# Patient Record
Sex: FEMALE | Race: WHITE
[De-identification: names, ages, dates, MRNs, and addresses within clinical notes are randomized per-mention and may not be internally consistent; named-entity substitution may affect disease eponyms.]

---

## 2018-03-03 ENCOUNTER — HOSPITAL ENCOUNTER (INPATIENT)
Dept: HOSPITAL 26 - MED | Age: 68
LOS: 6 days | Discharge: LEFT BEFORE DISCHARGE | DRG: 673 | End: 2018-03-09
Attending: FAMILY MEDICINE | Admitting: FAMILY MEDICINE
Payer: COMMERCIAL

## 2018-03-03 VITALS — SYSTOLIC BLOOD PRESSURE: 115 MMHG | DIASTOLIC BLOOD PRESSURE: 48 MMHG

## 2018-03-03 VITALS — WEIGHT: 153.04 LBS | HEIGHT: 62 IN | BODY MASS INDEX: 28.16 KG/M2

## 2018-03-03 VITALS — DIASTOLIC BLOOD PRESSURE: 74 MMHG | SYSTOLIC BLOOD PRESSURE: 152 MMHG

## 2018-03-03 DIAGNOSIS — E87.70: ICD-10-CM

## 2018-03-03 DIAGNOSIS — E11.22: ICD-10-CM

## 2018-03-03 DIAGNOSIS — E86.0: ICD-10-CM

## 2018-03-03 DIAGNOSIS — E46: ICD-10-CM

## 2018-03-03 DIAGNOSIS — N25.81: ICD-10-CM

## 2018-03-03 DIAGNOSIS — J44.1: ICD-10-CM

## 2018-03-03 DIAGNOSIS — N18.6: ICD-10-CM

## 2018-03-03 DIAGNOSIS — Z91.15: ICD-10-CM

## 2018-03-03 DIAGNOSIS — E66.9: ICD-10-CM

## 2018-03-03 DIAGNOSIS — E83.39: ICD-10-CM

## 2018-03-03 DIAGNOSIS — N17.9: ICD-10-CM

## 2018-03-03 DIAGNOSIS — J44.0: ICD-10-CM

## 2018-03-03 DIAGNOSIS — E78.00: ICD-10-CM

## 2018-03-03 DIAGNOSIS — Z79.4: ICD-10-CM

## 2018-03-03 DIAGNOSIS — I12.0: Primary | ICD-10-CM

## 2018-03-03 DIAGNOSIS — Z99.2: ICD-10-CM

## 2018-03-03 DIAGNOSIS — D63.8: ICD-10-CM

## 2018-03-03 DIAGNOSIS — J20.9: ICD-10-CM

## 2018-03-03 DIAGNOSIS — E11.65: ICD-10-CM

## 2018-03-03 LAB
ALBUMIN FLD-MCNC: 2.3 G/DL (ref 3.4–5)
ANION GAP SERPL CALCULATED.3IONS-SCNC: 13.2 MMOL/L (ref 8–16)
ANION GAP SERPL CALCULATED.3IONS-SCNC: 17.4 MMOL/L (ref 8–16)
APPEARANCE UR: CLEAR
AST SERPL-CCNC: 9 U/L (ref 15–37)
BASOPHILS # BLD AUTO: 0.1 K/UL (ref 0–0.22)
BASOPHILS NFR BLD AUTO: 0.5 % (ref 0–2)
BILIRUB SERPL-MCNC: 0.2 MG/DL (ref 0–1)
BILIRUB UR QL STRIP: NEGATIVE
BUN SERPL-MCNC: 145 MG/DL (ref 7–18)
BUN SERPL-MCNC: 150 MG/DL (ref 7–18)
CHLORIDE SERPL-SCNC: 99 MMOL/L (ref 98–107)
CHLORIDE SERPL-SCNC: 99 MMOL/L (ref 98–107)
CO2 SERPL-SCNC: 21.2 MMOL/L (ref 21–32)
CO2 SERPL-SCNC: 23 MMOL/L (ref 21–32)
COLOR UR: (no result)
CREAT SERPL-MCNC: 6.9 MG/DL (ref 0.6–1.3)
CREAT SERPL-MCNC: 6.9 MG/DL (ref 0.6–1.3)
EOSINOPHIL # BLD AUTO: 0.4 K/UL (ref 0–0.4)
EOSINOPHIL NFR BLD AUTO: 3.2 % (ref 0–4)
ERYTHROCYTE [DISTWIDTH] IN BLOOD BY AUTOMATED COUNT: 12.9 % (ref 11.6–13.7)
GFR SERPL CREATININE-BSD FRML MDRD: 8 ML/MIN (ref 90–?)
GFR SERPL CREATININE-BSD FRML MDRD: 8 ML/MIN (ref 90–?)
GLUCOSE SERPL-MCNC: 224 MG/DL (ref 74–106)
GLUCOSE SERPL-MCNC: 248 MG/DL (ref 74–106)
GLUCOSE UR STRIP-MCNC: (no result) MG/DL
HCT VFR BLD AUTO: 22 % (ref 36–48)
HGB BLD-MCNC: 7.4 G/DL (ref 12–16)
HGB UR QL STRIP: (no result)
LEUKOCYTE ESTERASE UR QL STRIP: NEGATIVE
LYMPHOCYTES # BLD AUTO: 2 K/UL (ref 2.5–16.5)
LYMPHOCYTES NFR BLD AUTO: 15.3 % (ref 20.5–51.1)
MCH RBC QN AUTO: 29 PG (ref 27–31)
MCHC RBC AUTO-ENTMCNC: 34 G/DL (ref 33–37)
MCV RBC AUTO: 87 FL (ref 80–94)
MONOCYTES # BLD AUTO: 0.7 K/UL (ref 0.8–1)
MONOCYTES NFR BLD AUTO: 5.5 % (ref 1.7–9.3)
NEUTROPHILS # BLD AUTO: 9.9 K/UL (ref 1.8–7.7)
NEUTROPHILS NFR BLD AUTO: 75.5 % (ref 42.2–75.2)
NITRITE UR QL STRIP: NEGATIVE
PH UR STRIP: 5.5 [PH] (ref 5–9)
PLATELET # BLD AUTO: 271 K/UL (ref 140–450)
POTASSIUM SERPL-SCNC: 3.6 MMOL/L (ref 3.5–5.1)
POTASSIUM SERPL-SCNC: 4.2 MMOL/L (ref 3.5–5.1)
PROTHROMBIN TIME: 9.4 SECS (ref 10.8–13.4)
RBC # BLD AUTO: 2.53 MIL/UL (ref 4.2–5.4)
RBC #/AREA URNS HPF: (no result) /HPF (ref 0–5)
SODIUM SERPL-SCNC: 131 MMOL/L (ref 136–145)
SODIUM SERPL-SCNC: 134 MMOL/L (ref 136–145)
WBC # BLD AUTO: 13.1 K/UL (ref 4.8–10.8)
WBC,URINE: (no result) /HPF (ref 0–5)

## 2018-03-03 PROCEDURE — C1750 CATH, HEMODIALYSIS,LONG-TERM: HCPCS

## 2018-03-03 PROCEDURE — C1758 CATHETER, URETERAL: HCPCS

## 2018-03-03 PROCEDURE — P9016 RBC LEUKOCYTES REDUCED: HCPCS

## 2018-03-03 NOTE — NUR
Patient will be admitted to care of DR LOWERY. Admited to MED/SURG.  Will go to 
[id number]. Belongings list completed.  Report to VANESSA OWUSU.

## 2018-03-03 NOTE — NUR
PAGED THELMA ACKERMAN ON CALL FOR . CALLED BACK . GAVE INFORMATION ABOUT THE PT 
CONDITION. WITH ORDERS.

## 2018-03-03 NOTE — NUR
C/O ANTERIOR CHEST WALL PAIN NON RADIATING X3 DAYS PROVOKED BY HARD COUGH

FATIGUE, HACKING CONGESTED COUGH X 2 WKS

 . DENIES N/V/D; SKIN IS PINK/WARM/DRY; AAOX4, HR EVEN AND REGULAR; PT DENIES 
ANY FEVER,SOB AT THIS TIME; PATIENT STATES PAIN OF 8/10 AT THIS TIME; VSS; 
PATIENT POSITIONED FOR COMFORT; HOB ELEVATED; BEDRAILS UP X2; BED DOWN. ER MD 
MADE AWARE OF PT STATUS.

## 2018-03-03 NOTE — NUR
ADMITTED A  67F FROM ER. CAME BY ELLIE. MED SURG PT. CAME DUE TO PRODUCTIVE COUGH AND CHEST 
PAIN DUE TO COUGH AND FEVER. AS OF THIS TIME, PT IS AFEBRILE. WITH NO /CO OF ANY PAIN NOTED. 
AMBULATORY TO BATHROOM. WITH HL ON THE RT AC #20. SKIN INTACT ,JUST AN OLD SCAB ON THE LT 
KNEE. BOTH HAND FINGER SLIGHTLY SWOLLEN. PLAN OF CARE DISCUSSED AND VERBALIZED 
UNDERSTANDING. BED ON LOW POSITION, CALL LIGHT PLACED WITHIN EASY REACH.

## 2018-03-03 NOTE — NUR
TRIED CONTACTING FAMILY (DAUGHTER LIANG) FOR  HOME MEDICATIONS BUT UNABLE . NO HOME PHONE 
NUMBER. THE CELL PHONE WITH PT. WILL ENDORSE TO AM  NURSE.

## 2018-03-04 VITALS — DIASTOLIC BLOOD PRESSURE: 90 MMHG | SYSTOLIC BLOOD PRESSURE: 176 MMHG

## 2018-03-04 VITALS — DIASTOLIC BLOOD PRESSURE: 56 MMHG | SYSTOLIC BLOOD PRESSURE: 150 MMHG

## 2018-03-04 VITALS — SYSTOLIC BLOOD PRESSURE: 123 MMHG | DIASTOLIC BLOOD PRESSURE: 55 MMHG

## 2018-03-04 VITALS — DIASTOLIC BLOOD PRESSURE: 59 MMHG | SYSTOLIC BLOOD PRESSURE: 145 MMHG

## 2018-03-04 VITALS — SYSTOLIC BLOOD PRESSURE: 145 MMHG | DIASTOLIC BLOOD PRESSURE: 62 MMHG

## 2018-03-04 LAB
ALBUMIN FLD-MCNC: 2.4 G/DL (ref 3.4–5)
ANION GAP SERPL CALCULATED.3IONS-SCNC: 20.2 MMOL/L (ref 8–16)
AST SERPL-CCNC: 6 U/L (ref 15–37)
BASOPHILS NFR BLD MANUAL: 0 % (ref 0–2)
BILIRUB SERPL-MCNC: 0.2 MG/DL (ref 0–1)
BUN SERPL-MCNC: 151 MG/DL (ref 7–18)
CHLORIDE SERPL-SCNC: 96 MMOL/L (ref 98–107)
CO2 SERPL-SCNC: 19 MMOL/L (ref 21–32)
CREAT SERPL-MCNC: 7.2 MG/DL (ref 0.6–1.3)
EOSINOPHIL NFR BLD MANUAL: 0 % (ref 0–4)
ERYTHROCYTE [DISTWIDTH] IN BLOOD BY AUTOMATED COUNT: 13 % (ref 11.6–13.7)
GFR SERPL CREATININE-BSD FRML MDRD: 7 ML/MIN (ref 90–?)
GLUCOSE SERPL-MCNC: 442 MG/DL (ref 74–106)
HCT VFR BLD AUTO: 23.1 % (ref 36–48)
HGB BLD-MCNC: 7.8 G/DL (ref 12–16)
IRON SERPL-MCNC: 58 UG/DL (ref 35–150)
LYMPHOCYTES NFR BLD MANUAL: 7 % (ref 20–46)
MAGNESIUM SERPL-MCNC: 2.2 MG/DL (ref 1.8–2.4)
MCH RBC QN AUTO: 30 PG (ref 27–31)
MCHC RBC AUTO-ENTMCNC: 34 G/DL (ref 33–37)
MCV RBC AUTO: 88 FL (ref 80–94)
MONOCYTES NFR BLD MANUAL: 5 % (ref 5–12)
PHOSPHATE SERPL-MCNC: 6.8 MG/DL (ref 2.5–4.9)
PLATELET # BLD AUTO: 268 K/UL (ref 140–450)
POTASSIUM SERPL-SCNC: 5.2 MMOL/L (ref 3.5–5.1)
RBC # BLD AUTO: 2.64 MIL/UL (ref 4.2–5.4)
SODIUM SERPL-SCNC: 130 MMOL/L (ref 136–145)
WBC # BLD AUTO: 12 K/UL (ref 4.8–10.8)

## 2018-03-04 PROCEDURE — 30233N1 TRANSFUSION OF NONAUTOLOGOUS RED BLOOD CELLS INTO PERIPHERAL VEIN, PERCUTANEOUS APPROACH: ICD-10-PCS | Performed by: FAMILY MEDICINE

## 2018-03-04 PROCEDURE — B543ZZA ULTRASONOGRAPHY OF RIGHT JUGULAR VEINS, GUIDANCE: ICD-10-PCS | Performed by: SURGERY

## 2018-03-04 PROCEDURE — 5A1D70Z PERFORMANCE OF URINARY FILTRATION, INTERMITTENT, LESS THAN 6 HOURS PER DAY: ICD-10-PCS

## 2018-03-04 PROCEDURE — 05HM33Z INSERTION OF INFUSION DEVICE INTO RIGHT INTERNAL JUGULAR VEIN, PERCUTANEOUS APPROACH: ICD-10-PCS | Performed by: SURGERY

## 2018-03-04 RX ADMIN — Medication SCH DEV: at 06:38

## 2018-03-04 RX ADMIN — Medication SCH DEV: at 11:57

## 2018-03-04 RX ADMIN — DOCUSATE SODIUM SCH MG: 50 LIQUID ORAL at 09:15

## 2018-03-04 RX ADMIN — INSULIN GLARGINE SCH UNITS: 100 INJECTION, SOLUTION SUBCUTANEOUS at 21:37

## 2018-03-04 RX ADMIN — IPRATROPIUM BROMIDE AND ALBUTEROL SULFATE PRN ML: .5; 3 SOLUTION RESPIRATORY (INHALATION) at 18:15

## 2018-03-04 RX ADMIN — FERROUS SULFATE TAB 325 MG (65 MG ELEMENTAL FE) SCH MG: 325 (65 FE) TAB at 17:52

## 2018-03-04 RX ADMIN — FERROUS SULFATE TAB 325 MG (65 MG ELEMENTAL FE) SCH MG: 325 (65 FE) TAB at 11:56

## 2018-03-04 RX ADMIN — Medication SCH DEV: at 21:33

## 2018-03-04 RX ADMIN — Medication SCH DEV: at 16:30

## 2018-03-04 RX ADMIN — INSULIN LISPRO PRN UNITS: 100 INJECTION, SOLUTION INTRAVENOUS; SUBCUTANEOUS at 21:51

## 2018-03-04 RX ADMIN — IPRATROPIUM BROMIDE AND ALBUTEROL SULFATE PRN ML: .5; 3 SOLUTION RESPIRATORY (INHALATION) at 08:43

## 2018-03-04 RX ADMIN — INSULIN LISPRO PRN UNITS: 100 INJECTION, SOLUTION INTRAVENOUS; SUBCUTANEOUS at 17:48

## 2018-03-04 RX ADMIN — GUAIFENESIN AND CODEINE PHOSPHATE PRN ML: 100; 10 SOLUTION ORAL at 13:06

## 2018-03-04 RX ADMIN — IPRATROPIUM BROMIDE AND ALBUTEROL SULFATE PRN ML: .5; 3 SOLUTION RESPIRATORY (INHALATION) at 13:25

## 2018-03-04 RX ADMIN — FERROUS SULFATE TAB 325 MG (65 MG ELEMENTAL FE) SCH MG: 325 (65 FE) TAB at 09:18

## 2018-03-04 RX ADMIN — INSULIN LISPRO PRN UNITS: 100 INJECTION, SOLUTION INTRAVENOUS; SUBCUTANEOUS at 11:57

## 2018-03-04 NOTE — NUR
PT CONTINUES TO BE RESTLESS, ATTEMPTS TO GET OUT OF BED, DOES NOT FOLLOW COMMAND, PT APPEARS 
TOO DISORIENTED AND CONFUSED FOR AMBULATION TO BATHROOM, PT PLACED ON BED PAN,  C/O KNEE 
PAIN, TYLENOL GIVEN, DR ASCENCIO ON CALL FOR DR BEVERLEY HWANG.

## 2018-03-04 NOTE — NUR
PAGED DR. LOWERY   WAS  PAGED FOR BREATHING TREATMENTS. CALLED BACK WITH ORDER. RT MADE 
AWARE OF THE ORDER.

## 2018-03-04 NOTE — NUR
PT HAS SIGN CONSENT FOR HEMODIALYSIS CATH PLACEMENT, HEMODIALYSIS AND BLOOD TRANSFUSION. DR CHAMBERS HAS INSERTED SO CATH FOR HEMODIALYSIS ON THE RIGHT IJ. CENTRAL LINE DRESSING 
APPLIED.

## 2018-03-04 NOTE — NUR
PT UNABLE TO SIT STILL , AGITATED, RESP LABORED AUDIBLY WHEEZING AND WET BREATH SOUNDS, FREQ 
COUGHS WITH COPIOUS AMOUNT OF CLEAR SPUTUM, RT PAGED FOR PRN NEB TREATMENT, PT AWAITING 
DIALYSIS IN 1HR.

## 2018-03-04 NOTE — NUR
DR ASCENCIO CALLED BACK, PT CONDITION REPORTED, TELEPHONE ORDER RECEIVED FOR ONE TIME ATIVAN 
PRIOR TO DIALYSIS.  WILL REPORT TO PRIMARY NURSE.

## 2018-03-04 NOTE — NUR
PT AGITATED. PT STILL ON HEMODIALYSIS. TRIED TO PULL OUT HD CATHETER. ATIVAN 1MG IV PUSH WAS 
GIVEN AS PER ORDER.

## 2018-03-04 NOTE — NUR
PT REPORT RECEIVED FROM NIGHT SHIFT RN. PT IS AAOX4. PT HAS PRODUCTIVE COUGH AND SPASM. 
ESPINAL IN PLACE DRAINING CLEAR YELLOW URINE. IV NOTED TO RIGHT AC 20G SALINE LOCK, AND RIGHT 
FA 22G INFUSING WELL AND ASYMPTOMATIC. SKIN INTACT, AN OLD SCAB NOTED ON THE L KNEE. PLAN OF 
CARE DISCUSSED AND PT VERBALIZED UNDERSTANDING. BED ON LOW POSITION, CALL LIGHT PLACED 
WITHIN REACH. WILL CONTINUE TO MONITOR.

## 2018-03-04 NOTE — NUR
BLOOD SUGAR WAS CHECKED THIS AM FOR PT IS DIABETIC. RESULT 406. PAGED DR. FRANCIS , ON CALL FOR 
DR. LOWERY. CALLED BACK,. MADE HER AWARE OF THE BS. BS AC AND HS WITH SLIDING SCALE ORDER . 
TO GIVE HUMALOG 12 UNITS SUB Q ONCE  TO COVER  BLOOD SUGAR THIS AM.

## 2018-03-04 NOTE — NUR
HD DONE . NO OUTPUT AS PER HD NURSE, JUST CLEANED AS PER MD ORDER. LATEST BP AFTER THE 
PROCEDURE 170/58. PT IS ASLEEP AT THIS TIME. WILL CONTINUE TO MONITOR.

## 2018-03-04 NOTE — NUR
RECEIVED PT FROM DAY SHIFT NURSE JAYA-RN. AOX4, KAYLYNN PT. ON 2L/NC - 99% SAO2, NO S/S OF 
RESPIRATORY DISTRESS OR DISCOMFORT NOTED. RIGHT FA #22G HEPLOCK. RIGHT AC #20G HEPLOCK. WITH 
NEW RIGHT JUGULAR SO CATHETER FOR DIALYSIS SCHEDULED TONIGHT. ESPINAL CATHETER TO 
GRAVITY, CLEAR YELLOW URINE OUTPUT. BILATERAL LOWER EXTREMITY SCD MACHINE ON. BED IN LOWEST 
POSITION. CALL LIGHT WITHIN REACH. WILL CONTINUE TO MONITOR.

## 2018-03-04 NOTE — NUR
PT CLEANED UP IN BED.  REPOSITIONED FOR COMFORT THEN VITAL SIGNS TAKEN. /90, HR-102. 
WILL CALL MD TO MAKE AWARE.

## 2018-03-05 VITALS — DIASTOLIC BLOOD PRESSURE: 52 MMHG | SYSTOLIC BLOOD PRESSURE: 129 MMHG

## 2018-03-05 VITALS — SYSTOLIC BLOOD PRESSURE: 150 MMHG | DIASTOLIC BLOOD PRESSURE: 62 MMHG

## 2018-03-05 VITALS — SYSTOLIC BLOOD PRESSURE: 142 MMHG | DIASTOLIC BLOOD PRESSURE: 64 MMHG

## 2018-03-05 LAB
ALBUMIN FLD-MCNC: 2.3 G/DL (ref 3.4–5)
ANION GAP SERPL CALCULATED.3IONS-SCNC: 12.7 MMOL/L (ref 8–16)
ANION GAP SERPL CALCULATED.3IONS-SCNC: 12.7 MMOL/L (ref 8–16)
AST SERPL-CCNC: 10 U/L (ref 15–37)
BILIRUB SERPL-MCNC: 0.3 MG/DL (ref 0–1)
BUN SERPL-MCNC: 73 MG/DL (ref 7–18)
BUN SERPL-MCNC: 73 MG/DL (ref 7–18)
CHLORIDE SERPL-SCNC: 98 MMOL/L (ref 98–107)
CHLORIDE SERPL-SCNC: 98 MMOL/L (ref 98–107)
CO2 SERPL-SCNC: 27.7 MMOL/L (ref 21–32)
CO2 SERPL-SCNC: 27.7 MMOL/L (ref 21–32)
CREAT SERPL-MCNC: 4.3 MG/DL (ref 0.6–1.3)
CREAT SERPL-MCNC: 4.3 MG/DL (ref 0.6–1.3)
ERYTHROCYTE [DISTWIDTH] IN BLOOD BY AUTOMATED COUNT: 12.9 % (ref 11.6–13.7)
GFR SERPL CREATININE-BSD FRML MDRD: 13 ML/MIN (ref 90–?)
GFR SERPL CREATININE-BSD FRML MDRD: 13 ML/MIN (ref 90–?)
GLUCOSE SERPL-MCNC: 244 MG/DL (ref 74–106)
GLUCOSE SERPL-MCNC: 244 MG/DL (ref 74–106)
HCT VFR BLD AUTO: 28.3 % (ref 36–48)
HGB BLD-MCNC: 9.6 G/DL (ref 12–16)
IRON SERPL-MCNC: 119 UG/DL (ref 35–150)
LYMPHOCYTES NFR BLD MANUAL: 3 % (ref 20–46)
MCH RBC QN AUTO: 30 PG (ref 27–31)
MCHC RBC AUTO-ENTMCNC: 34 G/DL (ref 33–37)
MCV RBC AUTO: 87 FL (ref 80–94)
MONOCYTES NFR BLD MANUAL: 2 % (ref 5–12)
PLATELET # BLD AUTO: 207 K/UL (ref 140–450)
POTASSIUM SERPL-SCNC: 3.4 MMOL/L (ref 3.5–5.1)
POTASSIUM SERPL-SCNC: 3.4 MMOL/L (ref 3.5–5.1)
RBC # BLD AUTO: 3.25 MIL/UL (ref 4.2–5.4)
SODIUM SERPL-SCNC: 135 MMOL/L (ref 136–145)
SODIUM SERPL-SCNC: 135 MMOL/L (ref 136–145)
TIBC SERPL-MCNC: 165 UG/DL (ref 250–450)
WBC # BLD AUTO: 21.7 K/UL (ref 4.8–10.8)

## 2018-03-05 PROCEDURE — 5A1D70Z PERFORMANCE OF URINARY FILTRATION, INTERMITTENT, LESS THAN 6 HOURS PER DAY: ICD-10-PCS

## 2018-03-05 RX ADMIN — IPRATROPIUM BROMIDE AND ALBUTEROL SULFATE PRN ML: .5; 3 SOLUTION RESPIRATORY (INHALATION) at 18:57

## 2018-03-05 RX ADMIN — INSULIN GLARGINE SCH UNITS: 100 INJECTION, SOLUTION SUBCUTANEOUS at 21:12

## 2018-03-05 RX ADMIN — DOCUSATE SODIUM SCH MG: 50 LIQUID ORAL at 08:52

## 2018-03-05 RX ADMIN — FERROUS SULFATE TAB 325 MG (65 MG ELEMENTAL FE) SCH MG: 325 (65 FE) TAB at 17:04

## 2018-03-05 RX ADMIN — FERROUS SULFATE TAB 325 MG (65 MG ELEMENTAL FE) SCH MG: 325 (65 FE) TAB at 08:52

## 2018-03-05 RX ADMIN — FERROUS SULFATE TAB 325 MG (65 MG ELEMENTAL FE) SCH MG: 325 (65 FE) TAB at 12:11

## 2018-03-05 RX ADMIN — IPRATROPIUM BROMIDE AND ALBUTEROL SULFATE PRN ML: .5; 3 SOLUTION RESPIRATORY (INHALATION) at 13:20

## 2018-03-05 RX ADMIN — Medication SCH DEV: at 06:11

## 2018-03-05 RX ADMIN — Medication SCH DEV: at 11:42

## 2018-03-05 RX ADMIN — INSULIN LISPRO PRN UNITS: 100 INJECTION, SOLUTION INTRAVENOUS; SUBCUTANEOUS at 06:12

## 2018-03-05 RX ADMIN — INSULIN LISPRO PRN UNITS: 100 INJECTION, SOLUTION INTRAVENOUS; SUBCUTANEOUS at 21:12

## 2018-03-05 RX ADMIN — IPRATROPIUM BROMIDE AND ALBUTEROL SULFATE PRN ML: .5; 3 SOLUTION RESPIRATORY (INHALATION) at 06:06

## 2018-03-05 RX ADMIN — GUAIFENESIN AND CODEINE PHOSPHATE PRN ML: 100; 10 SOLUTION ORAL at 00:41

## 2018-03-05 RX ADMIN — INSULIN LISPRO PRN UNITS: 100 INJECTION, SOLUTION INTRAVENOUS; SUBCUTANEOUS at 17:08

## 2018-03-05 RX ADMIN — Medication SCH DEV: at 21:00

## 2018-03-05 RX ADMIN — INSULIN LISPRO PRN UNITS: 100 INJECTION, SOLUTION INTRAVENOUS; SUBCUTANEOUS at 12:15

## 2018-03-05 RX ADMIN — Medication SCH DEV: at 16:37

## 2018-03-05 NOTE — NUR
RECEIVED ORDER TO SET UP PATIENT FOR OP HD AT Located within [facility] [place].   I CALLED THE 
DIALYSIS CENTER AND SPOKE WITH BRADEN.    FAXED INFORMATION TO HER [phone]   PHONE   
[phone].    NO HEP PANEL YET.   PATIENT STILL HAVE SO CATH.

## 2018-03-05 NOTE — NUR
HS SNACK GIVEN. CALL LIGHT WITHIN REACH. CARE BOARD UPDATED. PLAN OF CARE DISCUSSED, 
VERBALIZED UNDERSTANDING WELL.

## 2018-03-05 NOTE — NUR
DR. CHAMBERS NOTED WBC-21.4, WANTS TO CALL PRIMARY MD TO INQUIRE FOR POSSIBILE ANTIBIOTIC, DR. ACEVEDO MADE AWARE AND BLOOD CULTURE X2 SENT.

## 2018-03-05 NOTE — NUR
REMOVED PATIENTS ESPINAL CATHETER PER DR SYED. PATIENT TOLERATED WELL. REMINDED HER THAT THE 
BEDSIDE COMMODE IS NEXT TO HER IF SHE NEEDS TO URINATE. CALL LIGHT WITHIN REACH. WILL 
CONTINUE TO MONITOR.

## 2018-03-05 NOTE — NUR
PT TRYING TO GET OUT OF BED. PT GETTING CONFUSED. PT NEEDING REDIRECTING. BE ALARM ON FOR 
SAFETY. WILL CONTINUE TO MONITOR.

## 2018-03-05 NOTE — NUR
PATIENT HAS BEEN SCREENED AND CATEGORIZED AS MODERATE NUTRITION RISK. PATIENT WILL BE SEEN 
WITHIN 3-5 DAYS OF ADMISSION.



3/5/18-3/7/18



ROJELIO HYATT RD

## 2018-03-05 NOTE — NUR
ASSUMED CARE OF PATIENT, AWAKE, ALERT AND ORIENTED. NO COMPLAINS. NO DISTRESS. CALL LIGHT 
WITHIN REACH.

## 2018-03-05 NOTE — NUR
RECEIVED REPORT FROM NIGHT SHIFT RN. PATIENT IS AAOX2, ON NASAL CANNULA, NO SIGNS AND 
SYMPTOMS OF ACUTE DISTRESS NOTED AT THIS TIME. PATIENT HAS IV TO RIGHT FOREARM 22G, SALINE 
LOCK. SITE IS CLEAN, DRY, PATENT AND INTACT. HAS RIGHT IJ SO CATH FOR DIALYSIS. 
DIALYSIS NURSE IS CURRENTLY HERE SETTING UP. PATIENT HAS ESPINAL CATHETER IN PLACE. ALSO HAS 
BEDSIDE COMMODE AT THE BEDSIDE. DISCUSSED PLAN OF CARE WITH PATIENT AND REINFORCEMENT WAS 
NEEDED. BED IS IN LOWEST POSITION, SIDE RAILS UP X3, CALL LIGHT WITHIN REACH, FALL RISK 
PROTOCOL IN PLACE. WILL CONTINUE TO MONITOR.

## 2018-03-06 VITALS — DIASTOLIC BLOOD PRESSURE: 55 MMHG | SYSTOLIC BLOOD PRESSURE: 133 MMHG

## 2018-03-06 VITALS — SYSTOLIC BLOOD PRESSURE: 133 MMHG | DIASTOLIC BLOOD PRESSURE: 66 MMHG

## 2018-03-06 VITALS — DIASTOLIC BLOOD PRESSURE: 60 MMHG | SYSTOLIC BLOOD PRESSURE: 168 MMHG

## 2018-03-06 LAB
ALBUMIN FLD-MCNC: 2.2 G/DL (ref 3.4–5)
ANION GAP SERPL CALCULATED.3IONS-SCNC: 12.2 MMOL/L (ref 8–16)
ANION GAP SERPL CALCULATED.3IONS-SCNC: 13.2 MMOL/L (ref 8–16)
AST SERPL-CCNC: 7 U/L (ref 15–37)
BASOPHILS # BLD AUTO: 0.1 K/UL (ref 0–0.22)
BASOPHILS NFR BLD AUTO: 0.4 % (ref 0–2)
BILIRUB SERPL-MCNC: 0.3 MG/DL (ref 0–1)
BUN SERPL-MCNC: 48 MG/DL (ref 7–18)
BUN SERPL-MCNC: 49 MG/DL (ref 7–18)
CHLORIDE SERPL-SCNC: 100 MMOL/L (ref 98–107)
CHLORIDE SERPL-SCNC: 100 MMOL/L (ref 98–107)
CO2 SERPL-SCNC: 29.2 MMOL/L (ref 21–32)
CO2 SERPL-SCNC: 29.2 MMOL/L (ref 21–32)
CREAT SERPL-MCNC: 3.8 MG/DL (ref 0.6–1.3)
CREAT SERPL-MCNC: 3.8 MG/DL (ref 0.6–1.3)
EOSINOPHIL # BLD AUTO: 0.1 K/UL (ref 0–0.4)
EOSINOPHIL NFR BLD AUTO: 0.4 % (ref 0–4)
ERYTHROCYTE [DISTWIDTH] IN BLOOD BY AUTOMATED COUNT: 13.1 % (ref 11.6–13.7)
GFR SERPL CREATININE-BSD FRML MDRD: 15 ML/MIN (ref 90–?)
GFR SERPL CREATININE-BSD FRML MDRD: 15 ML/MIN (ref 90–?)
GLUCOSE SERPL-MCNC: 112 MG/DL (ref 74–106)
GLUCOSE SERPL-MCNC: 112 MG/DL (ref 74–106)
HCT VFR BLD AUTO: 30.5 % (ref 36–48)
HGB BLD-MCNC: 10.4 G/DL (ref 12–16)
LYMPHOCYTES # BLD AUTO: 2.1 K/UL (ref 2.5–16.5)
LYMPHOCYTES NFR BLD AUTO: 12.8 % (ref 20.5–51.1)
MCH RBC QN AUTO: 30 PG (ref 27–31)
MCHC RBC AUTO-ENTMCNC: 34 G/DL (ref 33–37)
MCV RBC AUTO: 88 FL (ref 80–94)
MONOCYTES # BLD AUTO: 1.3 K/UL (ref 0.8–1)
MONOCYTES NFR BLD AUTO: 8 % (ref 1.7–9.3)
NEUTROPHILS # BLD AUTO: 12.9 K/UL (ref 1.8–7.7)
NEUTROPHILS NFR BLD AUTO: 78.4 % (ref 42.2–75.2)
PLATELET # BLD AUTO: 223 K/UL (ref 140–450)
POTASSIUM SERPL-SCNC: 3.4 MMOL/L (ref 3.5–5.1)
POTASSIUM SERPL-SCNC: 3.4 MMOL/L (ref 3.5–5.1)
RBC # BLD AUTO: 3.45 MIL/UL (ref 4.2–5.4)
SODIUM SERPL-SCNC: 138 MMOL/L (ref 136–145)
SODIUM SERPL-SCNC: 139 MMOL/L (ref 136–145)
WBC # BLD AUTO: 16.5 K/UL (ref 4.8–10.8)

## 2018-03-06 RX ADMIN — FERROUS SULFATE TAB 325 MG (65 MG ELEMENTAL FE) SCH MG: 325 (65 FE) TAB at 08:39

## 2018-03-06 RX ADMIN — Medication SCH DEV: at 16:21

## 2018-03-06 RX ADMIN — Medication SCH DEV: at 20:51

## 2018-03-06 RX ADMIN — Medication SCH DEV: at 11:43

## 2018-03-06 RX ADMIN — INSULIN GLARGINE SCH UNITS: 100 INJECTION, SOLUTION SUBCUTANEOUS at 20:54

## 2018-03-06 RX ADMIN — GUAIFENESIN AND CODEINE PHOSPHATE PRN ML: 100; 10 SOLUTION ORAL at 21:13

## 2018-03-06 RX ADMIN — IPRATROPIUM BROMIDE AND ALBUTEROL SULFATE PRN ML: .5; 3 SOLUTION RESPIRATORY (INHALATION) at 06:51

## 2018-03-06 RX ADMIN — INSULIN LISPRO PRN UNITS: 100 INJECTION, SOLUTION INTRAVENOUS; SUBCUTANEOUS at 17:14

## 2018-03-06 RX ADMIN — FERROUS SULFATE TAB 325 MG (65 MG ELEMENTAL FE) SCH MG: 325 (65 FE) TAB at 17:13

## 2018-03-06 RX ADMIN — INSULIN LISPRO PRN UNITS: 100 INJECTION, SOLUTION INTRAVENOUS; SUBCUTANEOUS at 12:08

## 2018-03-06 RX ADMIN — FERROUS SULFATE TAB 325 MG (65 MG ELEMENTAL FE) SCH MG: 325 (65 FE) TAB at 12:02

## 2018-03-06 RX ADMIN — Medication SCH DEV: at 05:58

## 2018-03-06 RX ADMIN — IPRATROPIUM BROMIDE AND ALBUTEROL SULFATE PRN ML: .5; 3 SOLUTION RESPIRATORY (INHALATION) at 13:07

## 2018-03-06 RX ADMIN — INSULIN LISPRO PRN UNITS: 100 INJECTION, SOLUTION INTRAVENOUS; SUBCUTANEOUS at 20:57

## 2018-03-06 RX ADMIN — DOCUSATE SODIUM SCH MG: 50 LIQUID ORAL at 08:43

## 2018-03-06 NOTE — NUR
I met with Patient and wife Clara Naqvi at bedside to discuss patient's discharge to SNF( 
Skilled Nursing Facility) [facility].  Mrs. Naqvi was stated feeling concern 
that Patient will be discharge to SNF when she has not visited the place and location, 
stated " I just don't Know if it a good place for my "  These writer explained to 
Mrs. Naqvi that facility has served community and many patient's with similar needs that 
her  has and explained that  Theresa has made every effort to find an 
facility closer to her home, that can provide services Patient is in need for and coordinate 
all transportation to placement, I Also explained that Patient needs to discharge today to a 
different level of care and that if he is not to discharge and she declined services; she 
will possibly be financially responsible for the hospitalization due to her declining of 
services.  Mrs Naqvi expressed understanding and stated that she just wants to see facility 
before Patient is send there for treatment I explained to her that she can and she can also 
Speak to [facility] facility Staff.  Mrs. Naqvi agreed and Shanita from [facility] call Mrs. Naqvi and arrange for her to go see facility at about 5:20.  Mrs. Naqvi Ended call and 
stated to these writer that she was in her way and will comeback to [facility] before transport is 
here ready to move Patient. She thank me for my assistance as we walk out the room stating 
that she will be back.

## 2018-03-06 NOTE — NUR
RECEIVED A CALL FROM BRADEN ON 3/5/18 FROM [facility].   SHE SAID THE CHAIR TIME 
FOR THIS PATIENT WILL BE TUESDAY, THURSDAY, SATURDAY AT 8:30A.M. AT [facility]  
[address] [facility] [address]   PHONE [phone]

## 2018-03-06 NOTE — NUR
USED  PHONE TO OBTAIN CONSENT FOR PERMACATH PLACEMENT. PATIENT AGREED TO PROCEDURE 
AND SIGNED CONSENT.

## 2018-03-06 NOTE — NUR
Patient in distress d/t her hair being caught on tape from her IJ quintion cath. Patient 
does not want us to pull the hair off the tape, she refused and told us to cut it. CNA 
helped remove some hair but she urged us to cut some of the hair off. Once we cut it she was 
relieved. Will continue to monitor the patient.

## 2018-03-06 NOTE — NUR
Patient sitting on bed, watching TV. No complaints of pain. Stabilized IJ alanna catheter 
with tape, it becomes loose when she sleeps on it. Will continue to monitor

## 2018-03-06 NOTE — NUR
RECD. RESTING IN BED, AWAKE, A/OX3. RESPIRATION EVEN AND UNLABORED. 02 SATURATING AT 96% ON 
ROOM AIR. SO CATH AT THE RIGHT IJ WITH DRESSING, FOR CHANGING IN AM,  IV SALINE LOCK AT 
THE RIGHT FOREARM G22, PATENT AND INTACT. USES BSC. SAFETY MEASURES ENFORCED. ON BILATERAL 
LEG SEQUENTIALS. PLAN OF CARE FOR THE SHIFT DISCUSSED. VERBALIZED UNDERSTANDING. DENIES PAIN 
0/10.

## 2018-03-06 NOTE — NUR
I call Patient's wife Clara Naqvi at [phone], to discuss discharge for patient today 
with MD. Recommendation to go to a Skilled Nursing Facility. I inform Mrs. Naqvi that case 
annette has attempted to have patient go to [facility] however; insurance did not approved 
due to patient needing a higher level of care.  Mrs. Naqvi stated that she did not want 
Patient to go to [place] and was thinking to take him home. I explained to Patient's wife 
Mrs. Naqvi that in order for  to search for Placement it needed to be discussed 
with her and been on agreement for the process of finding a SNF.  She verbalized 
understanding, and stated that she will like to see those places first.   I explained to 
Mrs. Naqvi that the process usually is handle by  and it consist on finding a 
bed and  facility that contracts with patient's insurance and when found it is sometimes 
very difficult to coordinate the visit first and it is possible to loose the bed if a delay 
was to happen.  Mrs. Naqvi agreed and verbalized understanding, she also stated that it was 
fine for case annette to search for a SNF.  I thank her for information and I ended the call.

## 2018-03-07 VITALS — DIASTOLIC BLOOD PRESSURE: 65 MMHG | SYSTOLIC BLOOD PRESSURE: 127 MMHG

## 2018-03-07 VITALS — DIASTOLIC BLOOD PRESSURE: 64 MMHG | SYSTOLIC BLOOD PRESSURE: 156 MMHG

## 2018-03-07 VITALS — DIASTOLIC BLOOD PRESSURE: 73 MMHG | SYSTOLIC BLOOD PRESSURE: 177 MMHG

## 2018-03-07 LAB
ANION GAP SERPL CALCULATED.3IONS-SCNC: 13.2 MMOL/L (ref 8–16)
BUN SERPL-MCNC: 64 MG/DL (ref 7–18)
CHLORIDE SERPL-SCNC: 100 MMOL/L (ref 98–107)
CO2 SERPL-SCNC: 27.3 MMOL/L (ref 21–32)
CREAT SERPL-MCNC: 4.5 MG/DL (ref 0.6–1.3)
EOSINOPHIL NFR BLD MANUAL: 1 % (ref 0–4)
ERYTHROCYTE [DISTWIDTH] IN BLOOD BY AUTOMATED COUNT: 13.1 % (ref 11.6–13.7)
GFR SERPL CREATININE-BSD FRML MDRD: 13 ML/MIN (ref 90–?)
GLUCOSE SERPL-MCNC: 155 MG/DL (ref 74–106)
HCT VFR BLD AUTO: 29.9 % (ref 36–48)
HGB BLD-MCNC: 9.5 G/DL (ref 12–16)
LYMPHOCYTES NFR BLD MANUAL: 12 % (ref 20–46)
MCH RBC QN AUTO: 28 PG (ref 27–31)
MCHC RBC AUTO-ENTMCNC: 32 G/DL (ref 33–37)
MCV RBC AUTO: 88 FL (ref 80–94)
MONOCYTES NFR BLD MANUAL: 5 % (ref 5–12)
PLATELET # BLD AUTO: 206 K/UL (ref 140–450)
POTASSIUM SERPL-SCNC: 3.5 MMOL/L (ref 3.5–5.1)
RBC # BLD AUTO: 3.41 MIL/UL (ref 4.2–5.4)
SODIUM SERPL-SCNC: 137 MMOL/L (ref 136–145)
WBC # BLD AUTO: 15.2 K/UL (ref 4.8–10.8)

## 2018-03-07 PROCEDURE — 5A1D70Z PERFORMANCE OF URINARY FILTRATION, INTERMITTENT, LESS THAN 6 HOURS PER DAY: ICD-10-PCS

## 2018-03-07 RX ADMIN — INSULIN LISPRO PRN UNITS: 100 INJECTION, SOLUTION INTRAVENOUS; SUBCUTANEOUS at 21:19

## 2018-03-07 RX ADMIN — DOCUSATE SODIUM SCH MG: 50 LIQUID ORAL at 09:00

## 2018-03-07 RX ADMIN — PIPERACILLIN SODIUM AND TAZOBACTAM SODIUM SCH MLS/HR: 2; .25 INJECTION, SOLUTION INTRAVENOUS at 21:34

## 2018-03-07 RX ADMIN — FERROUS SULFATE TAB 325 MG (65 MG ELEMENTAL FE) SCH MG: 325 (65 FE) TAB at 16:43

## 2018-03-07 RX ADMIN — INSULIN GLARGINE SCH UNITS: 100 INJECTION, SOLUTION SUBCUTANEOUS at 21:18

## 2018-03-07 RX ADMIN — Medication SCH DEV: at 12:20

## 2018-03-07 RX ADMIN — GUAIFENESIN AND CODEINE PHOSPHATE PRN ML: 100; 10 SOLUTION ORAL at 03:39

## 2018-03-07 RX ADMIN — INSULIN LISPRO PRN UNITS: 100 INJECTION, SOLUTION INTRAVENOUS; SUBCUTANEOUS at 12:24

## 2018-03-07 RX ADMIN — FERROUS SULFATE TAB 325 MG (65 MG ELEMENTAL FE) SCH MG: 325 (65 FE) TAB at 12:21

## 2018-03-07 RX ADMIN — FERROUS SULFATE TAB 325 MG (65 MG ELEMENTAL FE) SCH MG: 325 (65 FE) TAB at 08:00

## 2018-03-07 RX ADMIN — Medication SCH DEV: at 16:52

## 2018-03-07 RX ADMIN — Medication SCH DEV: at 06:12

## 2018-03-07 RX ADMIN — Medication SCH DEV: at 21:14

## 2018-03-07 RX ADMIN — PIPERACILLIN SODIUM AND TAZOBACTAM SODIUM SCH MLS/HR: 2; .25 INJECTION, SOLUTION INTRAVENOUS at 21:30

## 2018-03-07 NOTE — NUR
SPOKE TO DR. LOWERY, I LET HIM KNOW I HAD SPOKEN TO DR. CHAMBERS AND HE WOULD LIKE FOR THE PATIENT 
TO BE RECEIVING ANTIBIOTIC TREATMENT FOR HER ELEVATED WBC BEFORE HE DOES THE TUNNEL CATH. 
PER DR. LOWERY HE WILL PUT IN AN ANTIBIOTIC ORDER.

## 2018-03-07 NOTE — NUR
SPOKE TO DR. CHAMBERS, I ASKED HIM IF THE PATIENT WOULD BE HAVING THE PERMA CATH DONE TODAY. PER 
DR. CHAMBERS, PROCEDURE WILL MOST LIKELY BE DONE TOMORROW NIGHT, 3/8/18, SINCE HER WBC IS STILL 
ELEVATED. PATIENT SHOULD BE ON ANTIBIOTICS. OKAY TO GO AHEAD AND DO DIALYSIS TODAY.

## 2018-03-07 NOTE — NUR
CM NOTE

CONCURRENT REVIEW  FAXED TO [facility] / FAX# [phone], ATTN: FRANCISCA #[phone]

## 2018-03-07 NOTE — NUR
LEFT THIGHS AND LEFT LOWER EXTREMITY WITH ITCHINESS, APPLIED LOTION. REFUSED MED FOR 
ITCHINESS WHEN OFFERED.

## 2018-03-07 NOTE — NUR
PT WAS SITTING ON CHAIR AT BEDSIDE, PATIENT'S BED LINENS WERE CHANGED, ALL NEEDS MET AT THIS 
TIME, CALL LIGHT WITHIN REACH.

## 2018-03-07 NOTE — NUR
RECD. RESTING IN BED, AWAKE, A/OX3. RESPIRATION EVEN AND UNLABORED. ON 02 AT 2 LITERS VIA 
N/C, 02 SAT -97%. DIALYSIS ON  GOING VIA RIGHT JUGULAR SO CATH. COMPLAINT OF PAIN IN 
THE UPPER BACK 3/10, BUT REFUSED PAIN MEDICATION. ASSISTED TO LAY ON HER SIDES AND PLACED 
TWO PILLOWS ON BACK FOR COMFORT. PLAN OF CARE FOR THE SHIFT DISCUSSED. JUST NODS HEAD. SEEMS 
IMPATIENT WITH DIALYSIS, WANTS IT TO END SOON. VERBAL ENCOURAGEMENT GIVEN. ON BILATERAL LEG 
SEQUENTIALS. DIALYSIS NURSE AT THE BEDSIDE.

## 2018-03-07 NOTE — NUR
RECEIVED REPORT FROM NIGHT SHIFT NURSE. PT IS RESTING IN BED, AAOX4, AMBULATORY, PT HAS IV 
ON HER RIGHT FA, PATENT, INTACT, FLUSHING WELL, SL, PT HAS RIGHT IJ SO CATH, NO S/S OF 
RESPIRATORY DISTRESS OR DISCOMFORT NOTED, LEFT KNEE SCAB, DISCUSSED PLAN OF CARE WITH PT, PT 
VERBALIZED UNDERSTANDING, SAFETY/FALL PRECAUTIONS ARE IN PLACE, CALL LIGHT IS WITHIN REACH, 
WILL CONTINUE TO MONITOR.

## 2018-03-07 NOTE — NUR
SLEEPING COMFORTABLY IN BED, NPO FOR PLACEMENT OF DIALYSIS PERMA CATH TODAY. CONDITION 
REMAIN STABLE. WILL ENDORSE TO AM NURSE FOR CONTINUITY OF CARE.

## 2018-03-07 NOTE — NUR
3/7/2018

RD INITIAL ASSESSMENT COMPLETED



PLEASE REFER TO NUTRITION ASSESSMENT UNDER CARE ACTIVITY FOR ESTIMATED NUTRITIONAL NEEDS. 



RD TO DISCUSS RENAL DIETARY RESTRICTIONS WITH PT (COMPLETED) 



NURSING AND DIETARY STAFF TO CONTINUE TO ENCOURAGE INCREASED INTAKE



RD TO FOLLOW-UP IN 2-3 DAYS PATIENT IS HIGH RISK.



ROJELIO HYATT, RD

## 2018-03-07 NOTE — NUR
ENDORSED PT TO NIGHT SHIFT NURSE FOR CONTINUITY OF CARE. PT STABLE AT THIS TIME, DIALYSIS 
NURSE IS AT BEDSIDE.

## 2018-03-08 VITALS — DIASTOLIC BLOOD PRESSURE: 49 MMHG | SYSTOLIC BLOOD PRESSURE: 99 MMHG

## 2018-03-08 VITALS — DIASTOLIC BLOOD PRESSURE: 67 MMHG | SYSTOLIC BLOOD PRESSURE: 128 MMHG

## 2018-03-08 VITALS — SYSTOLIC BLOOD PRESSURE: 107 MMHG | DIASTOLIC BLOOD PRESSURE: 56 MMHG

## 2018-03-08 LAB
ALBUMIN FLD-MCNC: 2.1 G/DL (ref 3.4–5)
ANION GAP SERPL CALCULATED.3IONS-SCNC: 12.4 MMOL/L (ref 8–16)
AST SERPL-CCNC: 8 U/L (ref 15–37)
BASOPHILS # BLD AUTO: 0.1 K/UL (ref 0–0.22)
BASOPHILS NFR BLD AUTO: 0.5 % (ref 0–2)
BILIRUB SERPL-MCNC: 0.3 MG/DL (ref 0–1)
BUN SERPL-MCNC: 51 MG/DL (ref 7–18)
CHLORIDE SERPL-SCNC: 100 MMOL/L (ref 98–107)
CO2 SERPL-SCNC: 28.2 MMOL/L (ref 21–32)
CREAT SERPL-MCNC: 3.8 MG/DL (ref 0.6–1.3)
EOSINOPHIL # BLD AUTO: 0.3 K/UL (ref 0–0.4)
EOSINOPHIL NFR BLD AUTO: 1.7 % (ref 0–4)
ERYTHROCYTE [DISTWIDTH] IN BLOOD BY AUTOMATED COUNT: 12.8 % (ref 11.6–13.7)
GFR SERPL CREATININE-BSD FRML MDRD: 15 ML/MIN (ref 90–?)
GLUCOSE SERPL-MCNC: 132 MG/DL (ref 74–106)
HCT VFR BLD AUTO: 30 % (ref 36–48)
HGB BLD-MCNC: 10.4 G/DL (ref 12–16)
LYMPHOCYTES # BLD AUTO: 1.5 K/UL (ref 2.5–16.5)
LYMPHOCYTES NFR BLD AUTO: 10.1 % (ref 20.5–51.1)
MCH RBC QN AUTO: 30 PG (ref 27–31)
MCHC RBC AUTO-ENTMCNC: 35 G/DL (ref 33–37)
MCV RBC AUTO: 87 FL (ref 80–94)
MONOCYTES # BLD AUTO: 0.8 K/UL (ref 0.8–1)
MONOCYTES NFR BLD AUTO: 5.3 % (ref 1.7–9.3)
NEUTROPHILS # BLD AUTO: 12.5 K/UL (ref 1.8–7.7)
NEUTROPHILS NFR BLD AUTO: 82.4 % (ref 42.2–75.2)
PLATELET # BLD AUTO: 221 K/UL (ref 140–450)
POTASSIUM SERPL-SCNC: 3.6 MMOL/L (ref 3.5–5.1)
RBC # BLD AUTO: 3.46 MIL/UL (ref 4.2–5.4)
SODIUM SERPL-SCNC: 137 MMOL/L (ref 136–145)
WBC # BLD AUTO: 15.2 K/UL (ref 4.8–10.8)

## 2018-03-08 PROCEDURE — 05PYX3Z REMOVAL OF INFUSION DEVICE FROM UPPER VEIN, EXTERNAL APPROACH: ICD-10-PCS | Performed by: SURGERY

## 2018-03-08 PROCEDURE — 0JH60XZ INSERTION OF TUNNELED VASCULAR ACCESS DEVICE INTO CHEST SUBCUTANEOUS TISSUE AND FASCIA, OPEN APPROACH: ICD-10-PCS | Performed by: SURGERY

## 2018-03-08 PROCEDURE — 05HM33Z INSERTION OF INFUSION DEVICE INTO RIGHT INTERNAL JUGULAR VEIN, PERCUTANEOUS APPROACH: ICD-10-PCS | Performed by: SURGERY

## 2018-03-08 PROCEDURE — B5131ZA FLUOROSCOPY OF RIGHT JUGULAR VEINS USING LOW OSMOLAR CONTRAST, GUIDANCE: ICD-10-PCS | Performed by: SURGERY

## 2018-03-08 RX ADMIN — FERROUS SULFATE TAB 325 MG (65 MG ELEMENTAL FE) SCH MG: 325 (65 FE) TAB at 16:19

## 2018-03-08 RX ADMIN — Medication SCH DEV: at 06:04

## 2018-03-08 RX ADMIN — FERROUS SULFATE TAB 325 MG (65 MG ELEMENTAL FE) SCH MG: 325 (65 FE) TAB at 08:42

## 2018-03-08 RX ADMIN — PIPERACILLIN SODIUM AND TAZOBACTAM SODIUM SCH MLS/HR: 2; .25 INJECTION, SOLUTION INTRAVENOUS at 21:00

## 2018-03-08 RX ADMIN — SODIUM CHLORIDE SCH MLS/HR: 9 INJECTION, SOLUTION INTRAVENOUS at 13:00

## 2018-03-08 RX ADMIN — DOCUSATE SODIUM SCH MG: 50 LIQUID ORAL at 08:43

## 2018-03-08 RX ADMIN — FERROUS SULFATE TAB 325 MG (65 MG ELEMENTAL FE) SCH MG: 325 (65 FE) TAB at 12:43

## 2018-03-08 RX ADMIN — Medication SCH DEV: at 11:48

## 2018-03-08 RX ADMIN — Medication SCH DEV: at 21:00

## 2018-03-08 RX ADMIN — INSULIN GLARGINE SCH UNITS: 100 INJECTION, SOLUTION SUBCUTANEOUS at 21:00

## 2018-03-08 RX ADMIN — PIPERACILLIN SODIUM AND TAZOBACTAM SODIUM SCH MLS/HR: 2; .25 INJECTION, SOLUTION INTRAVENOUS at 08:42

## 2018-03-08 RX ADMIN — Medication SCH DEV: at 16:20

## 2018-03-08 NOTE — NUR
PATIENT ALERT ABLE TO VERBALIZE NEEDS. NO ACUTE DISTRESS NOTED. RESP EVEN AND UNLABORED. NO 
COUGH OR CONGESTION NOTED. LUNG SOUNDS CLEAR IN ALL FIELDS. BOWEL SOUNDS ACTIVE X 4 QUADS. 
PATIENT WITH RIJ SO CATH WITH 2 LUMENS. . RFA 22G SL. PATENT AND INTACT.  PATIENT NPO 
THIS AM FOR PERMACATH PLACEMENT. PATIENT Kiswahili SPEAKING . CONT ON O2 @2L/MIN VIA 
NC.ASSISTED PATIENT TO RESTROOM. CALL LIGHT WITHIN REACH. WILL CONT TO MONITOR PT.

## 2018-03-08 NOTE — NUR
ADMINISTERED SCHEDULED MEDICATIONS AS ORDERED. WITH SIP OF WATER. PATIENT TOLERATED WELL. 
PATIENT CONT NPO FOR PROCEDURE. PATIENT ALERT AND ABLE TO MAKE NEEDS KNOWN. NO ACUTE 
DISTRESS. CALL LIGHT WITHIN REACH. WILL CONT TO MONITOR PT.

## 2018-03-08 NOTE — NUR
PATIENT RESTING IN BED COMFORTABLY. PATIENT ON THE PHONE TALKING. NO ACUTE DISTRESS NOTED. 
NO C/O PAIN AT THIS TIME. WAITING FOR PORTACATH PROCEDURE.CALL LIGHT WITHIN REACH. WILL CONT 
TO MONITOR PT.

## 2018-03-08 NOTE — NUR
RECEIVED PT FROM DAY SHIFT NURSE BARBARA Fierro RN. PT RESTING IN BED AWAITING FOR OR PROCEDURE. 
AOX4. ON 2L NC. IV RIGHT FA 22G, 40ML/HR NS, RIGHT IJ SO CATH 2 LUMENS FOR DIALYSIS. PT 
HAS BEEN NPO FOR PROCEDURE. NO S/S OF RESPIRATORY DISTRESS OR DISCOMFORT NOTED AT THIS TIME. 
CALL LIGHT WITHIN REACH. WILL CONTINUE TO MONITOR.

## 2018-03-08 NOTE — NUR
HEPARIN INJECTION WAS NOT GIVEN. PT RETURNING FROM SURGERY. SEQUENTIAL PROPHYLAXIS BILATERAL 
LOWER EXTREMITIES IN PLACE.

## 2018-03-08 NOTE — NUR
PATIENT RESTING IN BED. IV TO RFA WITH NS @40CC/HR. PATIENT TOLERATED WELL. NO ACUTE 
DISTRESS. RESP EVEN AND UNLABORED. DENIES PAIN. CALL LIGHT WITHIN REACH. WILL CONT TO 
MONITOR PT.

## 2018-03-08 NOTE — NUR
CONDITION REMAIN STABLE. NPO FOR PERMA CATH PLACEMENT TODAY. WILL ENDORSE TO AM NURSE FOR 
CONTINUITY OF CARE.

## 2018-03-08 NOTE — NUR
ADMINISTERED MORNING MEDICATIONS AS SCHEDULED. PATIENT TOLERATED WELL. PATIENT NPO PRIOR TO 
PORTACATH PROCEDURE. RESIDENT ALERT AND ABLE TO MAKE NEEDS KNOWN. NO ACUTE DISTRESS. NO C/O 
PAIN. CALL LIGHT WITHIN REACH. WILL CONT TO MONITOR.

## 2018-03-08 NOTE — NUR
SHOUTED, WHEN ASKED WHY STATED SHE HAS BACK PAIN BUT REFUSED PAIN MEDICATION WHEN OFFERED. 
MADE COMFORTABLE WITH PILLOWS.

## 2018-03-08 NOTE — NUR
PT RETURNED TO THE UNIT VIA GURNEY FROM OR PROCEDURE. PT RESTING IN BED. ZOSYN WAS GIVEN 
BEFORE PROCEDURE. FANTYL, VERSED AND MORPHINE WAS GIVEN IN OR FOR PROCEDURE AS MODERATE 
SEDATION. CXR DONE. IJ QUNTON CATH AS BEEN D/C. RIGHT SUBCLAVIAN WAS INSERTED FOR DIALYSIS 
BY DR. CHAMBERS.

## 2018-03-08 NOTE — NUR
CM NOTE

CONCURRENT REVIEW  FAXED TO [facility] / FAX# [phone], ATTN: FRANCISCA #[phone]

## 2018-03-08 NOTE — NUR
SPOKE WITH PATIENT AND DAUGHTER LIANG REGARDING PROCEDURE PENDING AND IMPORTANCE OF PROCEDURE. 
NOTIFIED THEM PROCEDURE WOULD BE DONE THIS EVENING. PATIENT VERBALIZED UNDERSTANDING AND 
AGREEMENT.

## 2018-03-09 VITALS — SYSTOLIC BLOOD PRESSURE: 99 MMHG | DIASTOLIC BLOOD PRESSURE: 47 MMHG

## 2018-03-09 VITALS — DIASTOLIC BLOOD PRESSURE: 70 MMHG | SYSTOLIC BLOOD PRESSURE: 157 MMHG

## 2018-03-09 VITALS — SYSTOLIC BLOOD PRESSURE: 122 MMHG | DIASTOLIC BLOOD PRESSURE: 73 MMHG

## 2018-03-09 LAB
ANION GAP SERPL CALCULATED.3IONS-SCNC: 16 MMOL/L (ref 8–16)
BASOPHILS # BLD AUTO: 0.1 K/UL (ref 0–0.22)
BASOPHILS NFR BLD AUTO: 0.4 % (ref 0–2)
BUN SERPL-MCNC: 65 MG/DL (ref 7–18)
CHLORIDE SERPL-SCNC: 99 MMOL/L (ref 98–107)
CO2 SERPL-SCNC: 27.7 MMOL/L (ref 21–32)
CREAT SERPL-MCNC: 4.9 MG/DL (ref 0.6–1.3)
EOSINOPHIL # BLD AUTO: 0.2 K/UL (ref 0–0.4)
EOSINOPHIL NFR BLD AUTO: 1.4 % (ref 0–4)
ERYTHROCYTE [DISTWIDTH] IN BLOOD BY AUTOMATED COUNT: 13.5 % (ref 11.6–13.7)
GFR SERPL CREATININE-BSD FRML MDRD: 11 ML/MIN (ref 90–?)
GLUCOSE SERPL-MCNC: 147 MG/DL (ref 74–106)
HCT VFR BLD AUTO: 32 % (ref 36–48)
HGB BLD-MCNC: 10.6 G/DL (ref 12–16)
LYMPHOCYTES # BLD AUTO: 1.3 K/UL (ref 2.5–16.5)
LYMPHOCYTES NFR BLD AUTO: 7.9 % (ref 20.5–51.1)
MCH RBC QN AUTO: 30 PG (ref 27–31)
MCHC RBC AUTO-ENTMCNC: 33 G/DL (ref 33–37)
MCV RBC AUTO: 90 FL (ref 80–94)
MONOCYTES # BLD AUTO: 0.8 K/UL (ref 0.8–1)
MONOCYTES NFR BLD AUTO: 5 % (ref 1.7–9.3)
NEUTROPHILS # BLD AUTO: 14.2 K/UL (ref 1.8–7.7)
NEUTROPHILS NFR BLD AUTO: 85.3 % (ref 42.2–75.2)
PLATELET # BLD AUTO: 226 K/UL (ref 140–450)
POTASSIUM SERPL-SCNC: 3.7 MMOL/L (ref 3.5–5.1)
RBC # BLD AUTO: 3.54 MIL/UL (ref 4.2–5.4)
SODIUM SERPL-SCNC: 139 MMOL/L (ref 136–145)
WBC # BLD AUTO: 16.7 K/UL (ref 4.8–10.8)

## 2018-03-09 RX ADMIN — DOCUSATE SODIUM SCH MG: 50 LIQUID ORAL at 09:00

## 2018-03-09 RX ADMIN — INSULIN LISPRO PRN UNITS: 100 INJECTION, SOLUTION INTRAVENOUS; SUBCUTANEOUS at 13:04

## 2018-03-09 RX ADMIN — FERROUS SULFATE TAB 325 MG (65 MG ELEMENTAL FE) SCH MG: 325 (65 FE) TAB at 09:29

## 2018-03-09 RX ADMIN — FERROUS SULFATE TAB 325 MG (65 MG ELEMENTAL FE) SCH MG: 325 (65 FE) TAB at 12:00

## 2018-03-09 RX ADMIN — Medication SCH DEV: at 16:30

## 2018-03-09 RX ADMIN — Medication SCH DEV: at 11:30

## 2018-03-09 RX ADMIN — SODIUM CHLORIDE SCH MLS/HR: 9 INJECTION, SOLUTION INTRAVENOUS at 13:05

## 2018-03-09 RX ADMIN — PIPERACILLIN SODIUM AND TAZOBACTAM SODIUM SCH MLS/HR: 2; .25 INJECTION, SOLUTION INTRAVENOUS at 09:56

## 2018-03-09 RX ADMIN — Medication SCH DEV: at 07:30

## 2018-03-09 NOTE — NUR
PT SLEEPING AT THIS TIME. NO S/S OF RESPIRATORY DISTRESS OR DISCOMFORT AT THIS TIME. CALL 
LIGHT WITHIN REACH. BED IN LOWEST POSITION. WILL CONTINUE TO MONITOR.

## 2018-03-09 NOTE — NUR
DR LOWERY IN TO SEE PATIENT. WITH NEW ORDER TO DC HOME ONCE DR BOYLE GIVE CLEARANCE TO DC 
HOME. PATIENT VERBALIZED DR LOWERY MADE HER AWARE OF DISCHARGE ORDER. AWAITING DR BOYLE FOR 
F/U.

## 2018-03-09 NOTE — NUR
PATIENT BLOOD SUGAR WAS 65 APPLE JUICE GIVEN REINFORCED TO PATIENT THE IMPORTANCE OF STAYING 
FOR CLEARANCE FROM DR BOYLE . PATIENT REFUSED TO STAY PATIENT ASSISTED TO FRONT LOBBY VIA 
WC . GAVE PATIENT ANOTHER APPLE JUICE. AND REITERATED THE IMPORTANCE OF CHECKING BLOOD 
GLUCOSE EXPLAINED RISKS AND BENEFITS. REMINDED PATIENT TO F/U WITH DIALYSIS CENTER TOMORROW 
AS SCHEDULED AT 0900. PATIENT GOT INTO PRIVATE VEHICLE WITHOUT DIFFICULTIES.

## 2018-03-09 NOTE — NUR
VITAL SIGNS TAKEN. PT TOLERATED WELL. /73, , 98.2 TEMP, 94 O2SAT, 20 RR. NO S/S 
OF RESPIRATORY DISTRESS OR DISCOMFORT NOTED AT THIS TIME. PT RESTING IN BED. CALL LIGHT 
WITHIN REACH. BED IN LOWEST POSITION. WILL CONTINUE TO MONITOR.

## 2018-03-09 NOTE — NUR
WENT TO THE PATIENT'S  ROOM TO ASK ABOUT TRANSPORT TO DIALYSIS CENTER.   DR. LOWERY HERE 
SPEAKING WITH THE PATIENT IN Maltese.   HE ASKED HER ABOUT TRANSPORT AND SHE SAID SHE WOULD 
CALL HER FAMILY ABOUT TRANSPORT.   I INFORMED  AND PATIENT SHE IS SCHEDULED FOR TTHSAT AT 
9A.M.

## 2018-03-09 NOTE — NUR
VERBAL REPORT GIVEN TO FRANCISCA FROM [facility].   PATIENT WAS TO WAIT FOR DR. BOYLE, BUT SHE LEFT 
AMA.   PER NURSE,BARBARA, THE PATIENT KNOWS TO GO TO DIALYSIS AT 9AM TOMORROW.   TO [facility] 
[facility]

## 2018-03-09 NOTE — NUR
PATIENT ALERT AND ABLE TO VERBALIZE NEEDS . ADMINISTERED SCHEDULED MEDICATIONS AS ORDERED. 
IV SITE TO LEFT WRIST 22 G . PATEINT TOLERATED. WELL . ZOSYN ADMINISTERED. CALL LIGHT WITHIN 
REACH. WILL CONT TO MONITOR PT.

## 2018-03-09 NOTE — NUR
PT ALERT AND ABLE TO VERBALIZE NEEDS. NO ACUTE DISTRESS.PATIENT WITH IV TO RFA BUT DOES NOT 
FLUSH. TO CHANGE SITE BEFORE ATB DOSE IS DUE . SKIN INTACT. NO C/O PAIN OR DISCOMFORT. 
PATIENT USES TOILET PRN. DISCUSSED POC WITH PATIENT . PATIENT VERBALIZED UNDERSTANDING. 
PATIENT CALL LIGHT WITHIN REACH. WILL CONT TO MONITOR PT.

## 2018-03-09 NOTE — NUR
PT ASKING FOR JELLO. PT NO LONGER ON NPO AS OF 3/8/2018 @ 2146. RENAL DIET TO START FOR 
BREAKFAST. JELLO WAS GIVEN.

## 2018-03-09 NOTE — NUR
SPOKE WITH BRADEN FROM [facility].   SHE SAID SHE STILL NEEDED THE HEP PANEL AND 
MOST RECENT FLOW SHEET AND OPERATIVE REPORT FOR THE TUNNELED CATHETER.    FAXED ALL TO HER 
-1051.

SHE SAID THE CHAIR TIME WAS CHANGED TO TTHSAT AT 9A.M.   BE THERE 30MINUTES PRIOR TO FIRST 
DIALYSIS.

## 2018-03-09 NOTE — NUR
SPOKE TO DR SYED REGARDING PATIENT REFUSING DIALYSIS . DR SYED STATED OKAY FOR HER TO WAIT 
FOR DIALYSIS TOMORROW. DIALYSIS NURSE RAIMUNDO MADE AWARE.

## 2018-03-09 NOTE — NUR
PATIENT VERBALIZED SHE HAD A FRIEND THAT WAS COMING TO PICK HER UP FROM THE HOSPITAL AND 
THAT SHE WAS NOT GOING TO WAIT FOR DR BOYLE TO SEE HER TONIGHT. ASKED PATIENT IF SHE WANTED 
TO LEAVE HOSPITAL AGAINST MEDICAL ADVICE AND PT VERBALIZED YES. CALLED DR LOWERY TO NOTIFY 
HIM OF PATIENT DECISION TO LEAVE AMA. DR LOWERY STATED TO CALL DR BOYLE . DR BOYLE MADE 
AWARE AND HE STATED THAT HE HAD NEVER CONSULTED WITH PATIENT BEFORE . DR LOWERY HAD PUT IN 
NEW ORDER FOR CONSULT THIS AFTERNOON WHILE IN UNIT. PATIENT SIGNED AMA FORM AND WAS GIVEN RX 
SCRIPT.

## 2018-03-09 NOTE — NUR
ENDORSED PT TO NURSE BARBARA-RN. PT IN STABLE CONDITION. NO S/S OF RESPIRATORY DISTRESS OR 
DISCOMFORT NOTED AT THIS TIME.

## 2019-01-15 ENCOUNTER — HOSPITAL ENCOUNTER (EMERGENCY)
Dept: HOSPITAL 26 - MED | Age: 69
Discharge: HOME | End: 2019-01-15
Payer: COMMERCIAL

## 2019-01-15 VITALS — BODY MASS INDEX: 28.35 KG/M2 | WEIGHT: 160 LBS | HEIGHT: 63 IN

## 2019-01-15 VITALS — DIASTOLIC BLOOD PRESSURE: 99 MMHG | SYSTOLIC BLOOD PRESSURE: 202 MMHG

## 2019-01-15 VITALS — DIASTOLIC BLOOD PRESSURE: 89 MMHG | SYSTOLIC BLOOD PRESSURE: 181 MMHG

## 2019-01-15 DIAGNOSIS — Z99.2: ICD-10-CM

## 2019-01-15 DIAGNOSIS — M79.651: ICD-10-CM
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
## 2019-01-15 DIAGNOSIS — M79.652: ICD-10-CM

## 2019-01-15 DIAGNOSIS — N18.6: ICD-10-CM
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
## 2019-01-15 DIAGNOSIS — Z79.899: ICD-10-CM

## 2019-01-15 DIAGNOSIS — Z79.4: ICD-10-CM

## 2019-01-15 DIAGNOSIS — E87.8: ICD-10-CM
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
## 2019-01-15 DIAGNOSIS — S80.02XA: Primary | ICD-10-CM

## 2019-01-15 DIAGNOSIS — I12.0: ICD-10-CM
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
## 2019-01-15 DIAGNOSIS — E11.22: ICD-10-CM

## 2019-01-15 DIAGNOSIS — S80.01XA: ICD-10-CM

## 2019-01-15 LAB
ALBUMIN FLD-MCNC: 3.5 G/DL (ref 3.4–5)
ANION GAP SERPL CALCULATED.3IONS-SCNC: 6.2 MMOL/L (ref 8–16)
AST SERPL-CCNC: 26 U/L (ref 15–37)
BILIRUB SERPL-MCNC: 0.8 MG/DL (ref 0–1)
BUN SERPL-MCNC: 7 MG/DL (ref 7–18)
CHLORIDE SERPL-SCNC: 97 MMOL/L (ref 98–107)
CO2 SERPL-SCNC: 37.4 MMOL/L (ref 21–32)
CREAT SERPL-MCNC: 2 MG/DL (ref 0.6–1.3)
EOSINOPHIL NFR BLD MANUAL: 2 % (ref 0–4)
ERYTHROCYTE [DISTWIDTH] IN BLOOD BY AUTOMATED COUNT: 17.3 % (ref 11.6–13.7)
GFR SERPL CREATININE-BSD FRML MDRD: 32 ML/MIN (ref 90–?)
GLUCOSE SERPL-MCNC: 268 MG/DL (ref 74–106)
HCT VFR BLD AUTO: 35 % (ref 36–48)
HGB BLD-MCNC: 11.5 G/DL (ref 12–16)
LYMPHOCYTES NFR BLD MANUAL: 13 % (ref 20–46)
MCH RBC QN AUTO: 30 PG (ref 27–31)
MCHC RBC AUTO-ENTMCNC: 33 G/DL (ref 33–37)
MCV RBC AUTO: 92.3 FL (ref 80–94)
MONOCYTES NFR BLD MANUAL: 4 % (ref 5–12)
PLATELET # BLD AUTO: 219 K/UL (ref 140–450)
POTASSIUM SERPL-SCNC: 4.6 MMOL/L (ref 3.5–5.1)
RBC # BLD AUTO: 3.79 MIL/UL (ref 4.2–5.4)
SODIUM SERPL-SCNC: 136 MMOL/L (ref 136–145)
WBC # BLD AUTO: 6.2 K/UL (ref 4.8–10.8)

## 2019-01-15 PROCEDURE — 96375 TX/PRO/DX INJ NEW DRUG ADDON: CPT

## 2019-01-15 PROCEDURE — 73521 X-RAY EXAM HIPS BI 2 VIEWS: CPT

## 2019-01-15 PROCEDURE — 73562 X-RAY EXAM OF KNEE 3: CPT

## 2019-01-15 PROCEDURE — 71045 X-RAY EXAM CHEST 1 VIEW: CPT

## 2019-01-15 PROCEDURE — 93005 ELECTROCARDIOGRAM TRACING: CPT

## 2019-01-15 PROCEDURE — 96374 THER/PROPH/DIAG INJ IV PUSH: CPT

## 2019-01-15 PROCEDURE — 85025 COMPLETE CBC W/AUTO DIFF WBC: CPT

## 2019-01-15 PROCEDURE — 99284 EMERGENCY DEPT VISIT MOD MDM: CPT

## 2019-01-15 PROCEDURE — 82948 REAGENT STRIP/BLOOD GLUCOSE: CPT

## 2019-01-15 PROCEDURE — 73552 X-RAY EXAM OF FEMUR 2/>: CPT

## 2019-01-15 PROCEDURE — 80053 COMPREHEN METABOLIC PANEL: CPT

## 2019-01-15 PROCEDURE — 84484 ASSAY OF TROPONIN QUANT: CPT

## 2019-01-15 PROCEDURE — 36415 COLL VENOUS BLD VENIPUNCTURE: CPT

## 2019-02-18 ENCOUNTER — HOSPITAL ENCOUNTER (INPATIENT)
Dept: HOSPITAL 26 - MED | Age: 69
LOS: 2 days | Discharge: HOME | DRG: 70 | End: 2019-02-20
Attending: INTERNAL MEDICINE | Admitting: INTERNAL MEDICINE
Payer: COMMERCIAL

## 2019-02-18 VITALS — BODY MASS INDEX: 28.27 KG/M2 | WEIGHT: 144 LBS | HEIGHT: 60 IN

## 2019-02-18 VITALS — DIASTOLIC BLOOD PRESSURE: 78 MMHG | SYSTOLIC BLOOD PRESSURE: 114 MMHG

## 2019-02-18 DIAGNOSIS — D63.1: ICD-10-CM

## 2019-02-18 DIAGNOSIS — J44.9: ICD-10-CM

## 2019-02-18 DIAGNOSIS — E11.22: ICD-10-CM

## 2019-02-18 DIAGNOSIS — K21.9: ICD-10-CM

## 2019-02-18 DIAGNOSIS — Z79.4: ICD-10-CM

## 2019-02-18 DIAGNOSIS — Z99.2: ICD-10-CM

## 2019-02-18 DIAGNOSIS — Z79.899: ICD-10-CM

## 2019-02-18 DIAGNOSIS — N18.6: ICD-10-CM

## 2019-02-18 DIAGNOSIS — I12.0: ICD-10-CM

## 2019-02-18 DIAGNOSIS — Z83.3: ICD-10-CM

## 2019-02-18 DIAGNOSIS — G93.41: Primary | ICD-10-CM

## 2019-02-18 LAB
ALBUMIN FLD-MCNC: 3.3 G/DL (ref 3.4–5)
ANION GAP SERPL CALCULATED.3IONS-SCNC: 10.2 MMOL/L (ref 8–16)
APPEARANCE UR: CLEAR
AST SERPL-CCNC: 9 U/L (ref 15–37)
BASOPHILS # BLD AUTO: 0 K/UL (ref 0–0.22)
BASOPHILS NFR BLD AUTO: 0.8 % (ref 0–2)
BILIRUB SERPL-MCNC: 0.4 MG/DL (ref 0–1)
BILIRUB UR QL STRIP: NEGATIVE
BUN SERPL-MCNC: 22 MG/DL (ref 7–18)
CHLORIDE SERPL-SCNC: 97 MMOL/L (ref 98–107)
CO2 SERPL-SCNC: 34.1 MMOL/L (ref 21–32)
COLOR UR: YELLOW
CREAT SERPL-MCNC: 3.7 MG/DL (ref 0.6–1.3)
EOSINOPHIL # BLD AUTO: 0.1 K/UL (ref 0–0.4)
EOSINOPHIL NFR BLD AUTO: 1.1 % (ref 0–4)
ERYTHROCYTE [DISTWIDTH] IN BLOOD BY AUTOMATED COUNT: 15.6 % (ref 11.6–13.7)
GFR SERPL CREATININE-BSD FRML MDRD: 16 ML/MIN (ref 90–?)
GLUCOSE SERPL-MCNC: 432 MG/DL (ref 74–106)
GLUCOSE UR STRIP-MCNC: (no result) MG/DL
HCT VFR BLD AUTO: 36.5 % (ref 36–48)
HGB BLD-MCNC: 12 G/DL (ref 12–16)
HGB UR QL STRIP: NEGATIVE
LEUKOCYTE ESTERASE UR QL STRIP: NEGATIVE
LYMPHOCYTES # BLD AUTO: 1.3 K/UL (ref 2.5–16.5)
LYMPHOCYTES NFR BLD AUTO: 20.7 % (ref 20.5–51.1)
MCH RBC QN AUTO: 29 PG (ref 27–31)
MCHC RBC AUTO-ENTMCNC: 33 G/DL (ref 33–37)
MCV RBC AUTO: 88.5 FL (ref 80–94)
MONOCYTES # BLD AUTO: 0.4 K/UL (ref 0.8–1)
MONOCYTES NFR BLD AUTO: 6.3 % (ref 1.7–9.3)
NEUTROPHILS # BLD AUTO: 4.5 K/UL (ref 1.8–7.7)
NEUTROPHILS NFR BLD AUTO: 71.1 % (ref 42.2–75.2)
NITRITE UR QL STRIP: NEGATIVE
PH UR STRIP: 7.5 [PH] (ref 5–9)
PLATELET # BLD AUTO: 174 K/UL (ref 140–450)
POTASSIUM SERPL-SCNC: 3.3 MMOL/L (ref 3.5–5.1)
PROTHROMBIN TIME: 9.1 SECS (ref 10.8–13.4)
RBC # BLD AUTO: 4.13 MIL/UL (ref 4.2–5.4)
RBC #/AREA URNS HPF: (no result) /HPF (ref 0–5)
SODIUM SERPL-SCNC: 138 MMOL/L (ref 136–145)
WBC # BLD AUTO: 6.3 K/UL (ref 4.8–10.8)
WBC,URINE: (no result) /HPF (ref 0–5)

## 2019-02-18 NOTE — NUR
RECEIVED BEDSIDE REPORT FROM ER NURSE. PATIENT AAOX4. PATIENT ON ROOM AIR, NO DISTRESS 
NOTED. SKIN INTACT. NO IV. PATIENT REFUSED. L AV SHUNT FISTULA BRUIT AND SWOOSH PRESENT. 
DIALYSIS ACCESS ON R UPPER CHEST. FALL RISK PROTOCOL IN PLACE. PATIENT ON MED SURGE AND 
STANDARD PRECAUTIONS. PATIENT WITH DIAPER FROM ER, ABLE TO AMBULATE WITH ASSIST AND 
CONTINENT. BED IN LOW POSITION, CALL LIGHT WITHIN REACH. WILL CONTINUE TO MONITOR.

## 2019-02-18 NOTE — NUR
BROUGHT IN FROM [facility]

PT COMPLETED DIALYSIS TODAY AND FOUND TO BE INCREASINGLY LETHARGIC

BLOOD SUGAR IN FIELD 314



SO CATHETOR RIGHT UPPER CHEST / OLD A/V SHUNT LUE



HX--ESRD, HTN, DM



RX---HYDRALAZINE 10MG QD, CLONIDINE 0.2MG, LISPRO 4UNITS, ZOFRAN 4MG, ATIVAN 
1MG, COLACE 100MG, FERROUS SULFATE 325MG

## 2019-02-18 NOTE — NUR
CALLED MARIE MCLEAN AT THIS TIME TO BEGIN ADMISSION PROCESS.

WAITING FOR CALL BACK FROM DR BROWN AT THIS TIME

## 2019-02-18 NOTE — NUR
PATIENT ADMITTED TO CARE OF DR BROWN ON MED SURG FLOOR. TAKEN IN [facility] TO ROOM 
117. PATIENT STABLE AND ALERT DURING TRANSFER. REPROT GIVEN TO RN.

## 2019-02-18 NOTE — NUR
RECEIVED FROM AM RN IN BED SITTING UP . ABLE TO VERBALIZE SIMPLE NEEDS IN ENGLISH. HD PT. 
PER AM RN SCHEDULED FOR HD TOMORROW AS ORDERED. NO IVF SITE RT PT. REFUSED. ROM X 4. CLEAR 
SPEECH. CALL LIGHT WITH IN REACH AND PER AM RN ABLE TO USE IT. GOOD AFFECT. SMILING.

## 2019-02-18 NOTE — NUR
HUMULIN R IVP NOT GIVEN SINCE PATIENT REFUSED IV. ALSO PATIENT NOT ON FLOOR AT THIS TIME FOR 
SCHEDULE OF HUMULIN R 5 UNITS ONCE AT 1630.

## 2019-02-19 VITALS — SYSTOLIC BLOOD PRESSURE: 167 MMHG | DIASTOLIC BLOOD PRESSURE: 63 MMHG

## 2019-02-19 VITALS — DIASTOLIC BLOOD PRESSURE: 66 MMHG | SYSTOLIC BLOOD PRESSURE: 144 MMHG

## 2019-02-19 VITALS — SYSTOLIC BLOOD PRESSURE: 175 MMHG | DIASTOLIC BLOOD PRESSURE: 82 MMHG

## 2019-02-19 LAB
ALBUMIN FLD-MCNC: 2.9 G/DL (ref 3.4–5)
ANION GAP SERPL CALCULATED.3IONS-SCNC: 9.1 MMOL/L (ref 8–16)
AST SERPL-CCNC: 6 U/L (ref 15–37)
BASOPHILS # BLD AUTO: 0 K/UL (ref 0–0.22)
BASOPHILS NFR BLD AUTO: 0.8 % (ref 0–2)
BILIRUB SERPL-MCNC: 0.3 MG/DL (ref 0–1)
BUN SERPL-MCNC: 36 MG/DL (ref 7–18)
CHLORIDE SERPL-SCNC: 94 MMOL/L (ref 98–107)
CO2 SERPL-SCNC: 33.3 MMOL/L (ref 21–32)
CREAT SERPL-MCNC: 5 MG/DL (ref 0.6–1.3)
EOSINOPHIL # BLD AUTO: 0.2 K/UL (ref 0–0.4)
EOSINOPHIL NFR BLD AUTO: 3.1 % (ref 0–4)
ERYTHROCYTE [DISTWIDTH] IN BLOOD BY AUTOMATED COUNT: 15.7 % (ref 11.6–13.7)
GFR SERPL CREATININE-BSD FRML MDRD: 11 ML/MIN (ref 90–?)
GLUCOSE SERPL-MCNC: 435 MG/DL (ref 74–106)
HCT VFR BLD AUTO: 34.4 % (ref 36–48)
HGB BLD-MCNC: 11.2 G/DL (ref 12–16)
LYMPHOCYTES # BLD AUTO: 1.7 K/UL (ref 2.5–16.5)
LYMPHOCYTES NFR BLD AUTO: 33.1 % (ref 20.5–51.1)
MCH RBC QN AUTO: 29 PG (ref 27–31)
MCHC RBC AUTO-ENTMCNC: 33 G/DL (ref 33–37)
MCV RBC AUTO: 89.8 FL (ref 80–94)
MONOCYTES # BLD AUTO: 0.4 K/UL (ref 0.8–1)
MONOCYTES NFR BLD AUTO: 7.7 % (ref 1.7–9.3)
NEUTROPHILS # BLD AUTO: 2.8 K/UL (ref 1.8–7.7)
NEUTROPHILS NFR BLD AUTO: 55.3 % (ref 42.2–75.2)
PLATELET # BLD AUTO: 165 K/UL (ref 140–450)
POTASSIUM SERPL-SCNC: 3.4 MMOL/L (ref 3.5–5.1)
RBC # BLD AUTO: 3.83 MIL/UL (ref 4.2–5.4)
SODIUM SERPL-SCNC: 133 MMOL/L (ref 136–145)
WBC # BLD AUTO: 5 K/UL (ref 4.8–10.8)

## 2019-02-19 RX ADMIN — Medication SCH DEV: at 16:30

## 2019-02-19 RX ADMIN — HYDROCODONE BITARTRATE AND ACETAMINOPHEN PRN TAB: 5; 325 TABLET ORAL at 00:56

## 2019-02-19 RX ADMIN — CALCIUM ACETATE SCH MG: 667 CAPSULE ORAL at 17:00

## 2019-02-19 RX ADMIN — Medication SCH DEV: at 20:24

## 2019-02-19 RX ADMIN — INSULIN LISPRO PRN UNITS: 100 INJECTION, SOLUTION INTRAVENOUS; SUBCUTANEOUS at 18:06

## 2019-02-19 RX ADMIN — Medication SCH DEV: at 11:36

## 2019-02-19 RX ADMIN — CALCIUM ACETATE SCH MG: 667 CAPSULE ORAL at 13:29

## 2019-02-19 RX ADMIN — INSULIN LISPRO PRN UNITS: 100 INJECTION, SOLUTION INTRAVENOUS; SUBCUTANEOUS at 11:34

## 2019-02-19 RX ADMIN — INSULIN LISPRO PRN UNITS: 100 INJECTION, SOLUTION INTRAVENOUS; SUBCUTANEOUS at 20:27

## 2019-02-19 RX ADMIN — FERROUS SULFATE TAB 325 MG (65 MG ELEMENTAL FE) SCH MG: 325 (65 FE) TAB at 20:29

## 2019-02-19 NOTE — NUR
DAUGHTER AT [facility]. NO COMPLAINTS DONE. PT. EATING FOOD PROVIDED BY DAUGHTER.

## 2019-02-19 NOTE — NUR
PT CRYING BECAUSE SHE IS NOT GETTING DIALYSIS TODAY, IT WAS EXPLAINED TO PT THAT DR BAUMAN 
CHANGED DIALYSIS TO TOMORROW BASED ON HER LAB RESULTS, PT HAVING DIFFICULTY UNDERSTANDING, 
DAUGHTER LIANG CALLED EXPLAINED THE REASON FOR DIALYSIS CANCEL TODAY, LIANG VERBALIZED 
UNDERSTANDING, SHE WILL EXPLAIN TO PATIENT.

## 2019-02-19 NOTE — NUR
PT C/O CRAMPING IN LEFT HAND, HAND MASSAGED PER PT'S REQUEST, PT STATES PAIN IS REWEAVED 
AFTER MASSAGE, WILL CONTINUE TO MONITOR.

## 2019-02-19 NOTE — NUR
ASSISTED TO RESTROOM LOCATED INSIDE ROOM. ABLE TO WALK WITH ASSIST. PUT BACK IN BED AND 
REQUESTED FOR PAIN RELIEVER. "I HURT ALL OVER"  WILL MEDICATE AS REQUESTED.

## 2019-02-19 NOTE — NUR
REPORT RECEIVED FROM NIGHT SHIFT NURSE, PT AAOX4, RESP EVEN UNLABORED, SKIN WARM DRY COLOR 
WNL, POC REVIEWD, ALL SAFETY MEASURES IN PLACE, WILL CONTINUE TO MONITOR.

## 2019-02-19 NOTE — NUR
PT STILL CRYING, DAUGHTER CALLED TO COMPLAIN THAT PATIENT IS NOT BEEING FED, PT DID % 
OF LUNCH TRAY, PT GIVEN TUNA SANDWICH FOR NOW.

## 2019-02-19 NOTE — NUR
PT CRYING SCREAMING STATES SHE IS VERY HUNGRY, STATES SHE DID NOT EAT LUNCH, SUGAR FREE 
PUDDING OFFERED, AND EXPLAINED TO PT THAT DINNER IS ARRIVING SOON.

## 2019-02-19 NOTE — NUR
RECEIVED FROM AM RN IN BED SLEEPING. WAKES UP EASILY WHEN TOUCHED. NO COMPLAINT AT THIS 
TIME. CALL LIGHT WITH IN REACH. PT. ABLE TO USE CALL LIGHT WELL. BED ALARM ON. RIGHT UPPER 
CHEST SO CATHETER FOR HD INTACT AND NO BLEEDING.

## 2019-02-19 NOTE — NUR
PT REQUESTS LENTILS FOR LUNCH, REQUESTS NOT TO HAVE CHICKEN, NO FISH, NO RICE. LEFT MESSAGE 
WITH FNS.

## 2019-02-19 NOTE — NUR
PT SITTING UP IN CHAIR BY THE WINDOW, ICE CHIPS GIVEN AS REQUESTED, DENIES ANY OTHER NEEDS 
AT THIS TIME.

## 2019-02-20 VITALS — SYSTOLIC BLOOD PRESSURE: 170 MMHG | DIASTOLIC BLOOD PRESSURE: 73 MMHG

## 2019-02-20 VITALS — SYSTOLIC BLOOD PRESSURE: 132 MMHG | DIASTOLIC BLOOD PRESSURE: 63 MMHG

## 2019-02-20 VITALS — SYSTOLIC BLOOD PRESSURE: 157 MMHG | DIASTOLIC BLOOD PRESSURE: 43 MMHG

## 2019-02-20 LAB
ALBUMIN FLD-MCNC: 2.8 G/DL (ref 3.4–5)
ANION GAP SERPL CALCULATED.3IONS-SCNC: 9 MMOL/L (ref 8–16)
AST SERPL-CCNC: 9 U/L (ref 15–37)
BASOPHILS # BLD AUTO: 0.1 K/UL (ref 0–0.22)
BASOPHILS NFR BLD AUTO: 0.8 % (ref 0–2)
BILIRUB SERPL-MCNC: 0.2 MG/DL (ref 0–1)
BUN SERPL-MCNC: 52 MG/DL (ref 7–18)
CHLORIDE SERPL-SCNC: 98 MMOL/L (ref 98–107)
CO2 SERPL-SCNC: 30.7 MMOL/L (ref 21–32)
CREAT SERPL-MCNC: 6.3 MG/DL (ref 0.6–1.3)
EOSINOPHIL # BLD AUTO: 0.2 K/UL (ref 0–0.4)
EOSINOPHIL NFR BLD AUTO: 3.8 % (ref 0–4)
ERYTHROCYTE [DISTWIDTH] IN BLOOD BY AUTOMATED COUNT: 15.5 % (ref 11.6–13.7)
GFR SERPL CREATININE-BSD FRML MDRD: 9 ML/MIN (ref 90–?)
GLUCOSE SERPL-MCNC: 264 MG/DL (ref 74–106)
HCT VFR BLD AUTO: 34.7 % (ref 36–48)
HGB BLD-MCNC: 11.4 G/DL (ref 12–16)
LYMPHOCYTES # BLD AUTO: 2.1 K/UL (ref 2.5–16.5)
LYMPHOCYTES NFR BLD AUTO: 32.5 % (ref 20.5–51.1)
MCH RBC QN AUTO: 29 PG (ref 27–31)
MCHC RBC AUTO-ENTMCNC: 33 G/DL (ref 33–37)
MCV RBC AUTO: 88.8 FL (ref 80–94)
MONOCYTES # BLD AUTO: 0.4 K/UL (ref 0.8–1)
MONOCYTES NFR BLD AUTO: 6.1 % (ref 1.7–9.3)
NEUTROPHILS # BLD AUTO: 3.7 K/UL (ref 1.8–7.7)
NEUTROPHILS NFR BLD AUTO: 56.8 % (ref 42.2–75.2)
PLATELET # BLD AUTO: 181 K/UL (ref 140–450)
POTASSIUM SERPL-SCNC: 3.7 MMOL/L (ref 3.5–5.1)
RBC # BLD AUTO: 3.9 MIL/UL (ref 4.2–5.4)
SODIUM SERPL-SCNC: 134 MMOL/L (ref 136–145)
WBC # BLD AUTO: 6.6 K/UL (ref 4.8–10.8)

## 2019-02-20 PROCEDURE — 5A1D70Z PERFORMANCE OF URINARY FILTRATION, INTERMITTENT, LESS THAN 6 HOURS PER DAY: ICD-10-PCS | Performed by: INTERNAL MEDICINE

## 2019-02-20 RX ADMIN — CALCIUM ACETATE SCH MG: 667 CAPSULE ORAL at 17:55

## 2019-02-20 RX ADMIN — FERROUS SULFATE TAB 325 MG (65 MG ELEMENTAL FE) SCH MG: 325 (65 FE) TAB at 09:00

## 2019-02-20 RX ADMIN — Medication SCH DEV: at 16:50

## 2019-02-20 RX ADMIN — CALCIUM ACETATE SCH MG: 667 CAPSULE ORAL at 13:00

## 2019-02-20 RX ADMIN — INSULIN LISPRO PRN UNITS: 100 INJECTION, SOLUTION INTRAVENOUS; SUBCUTANEOUS at 17:59

## 2019-02-20 RX ADMIN — HYDROCODONE BITARTRATE AND ACETAMINOPHEN PRN TAB: 5; 325 TABLET ORAL at 03:49

## 2019-02-20 RX ADMIN — CALCIUM ACETATE SCH MG: 667 CAPSULE ORAL at 09:00

## 2019-02-20 RX ADMIN — Medication SCH DEV: at 06:39

## 2019-02-20 RX ADMIN — INSULIN LISPRO PRN UNITS: 100 INJECTION, SOLUTION INTRAVENOUS; SUBCUTANEOUS at 06:40

## 2019-02-20 RX ADMIN — Medication SCH DEV: at 12:10

## 2019-02-20 RX ADMIN — INSULIN LISPRO PRN UNITS: 100 INJECTION, SOLUTION INTRAVENOUS; SUBCUTANEOUS at 12:10

## 2019-02-20 NOTE — NUR
AWAKE AT THIS TIME . BLOOD SPECIMEN COLLECTED BY PHLEBOTOMIST. PT. AM PERSONAL HYGIENE 
RENDERED BY CNA. BLOOD SUGAR CHECK PER FINGERSTICK 255. COVERED WITH HUMALOG INSULIN.

## 2019-02-20 NOTE — NUR
CM NOTE 



CM DIRECTOR JESSICA RECEIVED A CALL FROM PATIENT'S DAUGHTER VIRGINIA OWEN # [phone] 
STATING THAT SHE CANNOT  PATIENT WHEN DISCHARGED BECAUSE SHE IS GOING TO WORK AND 
THAT SHE CALLED [facility] WHO SAID THAT THEY CAN PROVIDE TRANSPORTATION.  I WAS REQUESTED BY KHARI JESSICA TO REQUEST TRANSPORTATION FROM [facility].

I CONFIRMED WITH PATIENT HER HOME ADDRESS.

FAXED TRANSPORTATION REQUEST FORM TO [facility] TRANSPORT[facility] DEPT [phone].  PER RIANA 
OF [facility] TRANSPORT[facility] DEPT PH# [phone], THEY HAVE RECEIVED THE REQUEST AND HAVE NOT 
YET SET UP TRANSPORTATION.  PER RIANA, ONCE TRANSPORTATION HAS BEEN ARRANGED THEIR 
COORDINATOR WILL GIVE THE HOSPITAL A CALL TO CONFIRM THE DETAILS ABOUT THE TRANSPORT.  I 
GAVE RIANA THE NUMBER TO THE NURSING STATION WHERE PATIENT IS IN CASE THEY CALL AT A LATER 
TIME.

## 2019-02-20 NOTE — NUR
RECEIVED PT FROM LUIS RN PT Syriac SPEAKER AAOX4 AMBULATORY AV SHUNT ON LEFT ARM AND  
SO CATH FOR DIALYSIS ON LEFT UPPER CHEST PT ON STABLE CONDITION READY TOGO HOME ALL 
DISCHARGE PAPER ALREADY SIGNED WAITING FOR [facility] TO PICK  PT UP INITIAL ASSESSMENT DONE.

## 2019-02-20 NOTE — NUR
[facility] IS HERE PT READY TO GO HOME ID WRIST IS REMOVED ALL DISCHARGED PROTOCOL DONE PT ON 
STABLE CONDITION CHARGE NURSE KIANA  AWARE PT DC

## 2019-02-20 NOTE — NUR
AWAKE AND ASSISTED AT THIS TIME TO RESTROOM. HAD 1 BM. PT. ON BED ALARM.  ASSISTED  SAFELY 
BACK IN BED . ABLE TO VERBALIZE NEEDS WELL. FOR HD SCHEDULED TOMORROW .

## 2019-02-20 NOTE — NUR
RECEIVED BEDSIDE REPORT FROM VANESSA OROZCO. PT STABLE, AWAKE, AND ALERT. NO SIGNS OF DISTRESS 
NOTED. DENIES PAIN. AMBULATED TO THE BATHROOM WITH STEADY GAIT. PT REFUSED IV SITE. NO 
REDNESS, SWELLING, OR INFLAMMATION NOTED ON SO CATHETER SITE. BED IN LOW POSITION. CALL 
LIGHT WITHIN REACH. SAFETY MEASURES IN PLACE. PLAN OF CARE REVIEWED.

## 2019-02-20 NOTE — NUR
KHARI NOTE



RECEIVED A CALL FROM PRITESH OF [facility]SP[facility] PH# [phone] STATING THAT THE 
PATIENT'S TRANSPORT HAS BEEN ARRANGED WITH [facility] AND THE EARLIEST THEY CAN  THE 
PATIENT FROM THE HOSPITAL TO GO HOME IS AT 7PM TODAY.  CHARGE NURSE COLIN AND SUMAN SMITH RN 
AWARE.


-------------------------------------------------------------------------------

Addendum: 02/20/19 at 1503 by Theresa Lai CM

-------------------------------------------------------------------------------

SPOKE WITH THE PATIENT'S DAUGHTER VIRGINIA OWEN PH# [phone] TO INFORM HER AND SHE SAID 
HER DAUGHTER WILL BE HOME TO RECEIVE THE PATIENT

## 2021-10-25 NOTE — NUR
Patient tolerated procedure. Discharge instructions reviewed with patient prior to procedure, All questions answered following prodcedure. Appropriate for discharge. Patient wheeled out to car     WITH PRODUCTIVE COUGHING BUT REFUSED COUGH MEDICINE WHEN OFFERED.